# Patient Record
Sex: FEMALE | Race: WHITE | Employment: FULL TIME | URBAN - METROPOLITAN AREA
[De-identification: names, ages, dates, MRNs, and addresses within clinical notes are randomized per-mention and may not be internally consistent; named-entity substitution may affect disease eponyms.]

---

## 2018-08-17 ENCOUNTER — CONVERSION ENCOUNTER (OUTPATIENT)
Dept: PLASTIC SURGERY | Facility: CLINIC | Age: 23
End: 2018-08-17

## 2018-08-17 ENCOUNTER — OFFICE VISIT CONVERTED (OUTPATIENT)
Dept: PLASTIC SURGERY | Facility: CLINIC | Age: 23
End: 2018-08-17
Attending: PLASTIC SURGERY

## 2019-02-05 ENCOUNTER — HOSPITAL ENCOUNTER (OUTPATIENT)
Dept: URGENT CARE | Facility: CLINIC | Age: 24
Discharge: HOME | End: 2019-02-05
Attending: FAMILY MEDICINE

## 2019-02-05 LAB — GLUCOSE BLD-MCNC: 140 MG/DL (ref 65–99)

## 2019-08-14 ENCOUNTER — HOSPITAL ENCOUNTER (OUTPATIENT)
Dept: ULTRASOUND IMAGING | Facility: HOSPITAL | Age: 24
Discharge: HOME | End: 2019-08-14
Attending: SPECIALIST

## 2019-09-26 ENCOUNTER — HOSPITAL ENCOUNTER (OUTPATIENT)
Dept: URGENT CARE | Facility: CLINIC | Age: 24
Discharge: HOME | End: 2019-09-26

## 2019-10-01 ENCOUNTER — HOSPITAL ENCOUNTER (OUTPATIENT)
Dept: URGENT CARE | Facility: CLINIC | Age: 24
Discharge: HOME | End: 2019-10-01
Attending: EMERGENCY MEDICINE

## 2020-02-18 ENCOUNTER — HOSPITAL ENCOUNTER (OUTPATIENT)
Dept: URGENT CARE | Facility: CLINIC | Age: 25
Discharge: HOME | End: 2020-02-18
Attending: PHYSICIAN ASSISTANT

## 2020-08-11 ENCOUNTER — HOSPITAL ENCOUNTER (OUTPATIENT)
Dept: URGENT CARE | Facility: CLINIC | Age: 25
Discharge: HOME | End: 2020-08-11
Attending: PHYSICIAN ASSISTANT

## 2020-09-03 ENCOUNTER — HOSPITAL ENCOUNTER (OUTPATIENT)
Dept: ULTRASOUND IMAGING | Facility: HOSPITAL | Age: 25
Discharge: HOME | End: 2020-09-03
Attending: OBSTETRICS & GYNECOLOGY

## 2020-09-17 ENCOUNTER — HOSPITAL ENCOUNTER (OUTPATIENT)
Dept: ULTRASOUND IMAGING | Facility: HOSPITAL | Age: 25
Discharge: HOME | End: 2020-09-17
Attending: OBSTETRICS & GYNECOLOGY

## 2020-10-02 ENCOUNTER — HOSPITAL ENCOUNTER (OUTPATIENT)
Dept: URGENT CARE | Facility: CLINIC | Age: 25
Discharge: HOME | End: 2020-10-02
Attending: PHYSICIAN ASSISTANT

## 2020-12-23 ENCOUNTER — HOSPITAL ENCOUNTER (OUTPATIENT)
Dept: LABOR AND DELIVERY | Facility: HOSPITAL | Age: 25
Discharge: HOME | End: 2020-12-23
Attending: OBSTETRICS & GYNECOLOGY

## 2021-01-27 ENCOUNTER — HOSPITAL ENCOUNTER (OUTPATIENT)
Dept: LABOR AND DELIVERY | Facility: HOSPITAL | Age: 26
Discharge: HOME | End: 2021-01-27
Attending: OBSTETRICS & GYNECOLOGY

## 2021-01-27 LAB
ALBUMIN SERPL-MCNC: 3.4 G/DL (ref 3.5–5)
ALBUMIN/GLOB SERPL: 1.2 {RATIO} (ref 1.4–2.6)
ALP SERPL-CCNC: 115 U/L (ref 42–98)
ALT SERPL-CCNC: <5 U/L (ref 10–40)
ANION GAP SERPL CALC-SCNC: 14 MMOL/L (ref 8–19)
AST SERPL-CCNC: 9 U/L (ref 15–50)
BASOPHILS # BLD AUTO: 0.06 10*3/UL (ref 0–0.2)
BASOPHILS NFR BLD AUTO: 0.6 % (ref 0–3)
BILIRUB SERPL-MCNC: 0.3 MG/DL (ref 0.2–1.3)
BUN SERPL-MCNC: 4 MG/DL (ref 5–25)
BUN/CREAT SERPL: 7 {RATIO} (ref 6–20)
CALCIUM SERPL-MCNC: 9.3 MG/DL (ref 8.7–10.4)
CHLORIDE SERPL-SCNC: 102 MMOL/L (ref 99–111)
CONV ABS IMM GRAN: 0.03 10*3/UL (ref 0–0.2)
CONV CO2: 22 MMOL/L (ref 22–32)
CONV IMMATURE GRAN: 0.3 % (ref 0–1.8)
CONV TOTAL PROTEIN: 6.3 G/DL (ref 6.3–8.2)
CREAT UR-MCNC: 0.56 MG/DL (ref 0.5–0.9)
DEPRECATED RDW RBC AUTO: 42.7 FL (ref 36.4–46.3)
EOSINOPHIL # BLD AUTO: 0.13 10*3/UL (ref 0–0.7)
EOSINOPHIL # BLD AUTO: 1.3 % (ref 0–7)
ERYTHROCYTE [DISTWIDTH] IN BLOOD BY AUTOMATED COUNT: 13.2 % (ref 11.7–14.4)
GFR SERPLBLD BASED ON 1.73 SQ M-ARVRAT: >60 ML/MIN/{1.73_M2}
GLOBULIN UR ELPH-MCNC: 2.9 G/DL (ref 2–3.5)
GLUCOSE SERPL-MCNC: 103 MG/DL (ref 65–99)
HCT VFR BLD AUTO: 35.4 % (ref 37–47)
HGB BLD-MCNC: 12.5 G/DL (ref 12–16)
LYMPHOCYTES # BLD AUTO: 2.54 10*3/UL (ref 1–5)
LYMPHOCYTES NFR BLD AUTO: 24.6 % (ref 20–45)
MCH RBC QN AUTO: 31.3 PG (ref 27–31)
MCHC RBC AUTO-ENTMCNC: 35.3 G/DL (ref 33–37)
MCV RBC AUTO: 88.5 FL (ref 81–99)
MONOCYTES # BLD AUTO: 0.47 10*3/UL (ref 0.2–1.2)
MONOCYTES NFR BLD AUTO: 4.6 % (ref 3–10)
NEUTROPHILS # BLD AUTO: 7.08 10*3/UL (ref 2–8)
NEUTROPHILS NFR BLD AUTO: 68.6 % (ref 30–85)
NRBC CBCN: 0 % (ref 0–0.7)
OSMOLALITY SERPL CALC.SUM OF ELEC: 275 MOSM/KG (ref 273–304)
PLATELET # BLD AUTO: 174 10*3/UL (ref 130–400)
PMV BLD AUTO: 11.1 FL (ref 9.4–12.3)
POTASSIUM SERPL-SCNC: 3.7 MMOL/L (ref 3.5–5.3)
RBC # BLD AUTO: 4 10*6/UL (ref 4.2–5.4)
SODIUM SERPL-SCNC: 134 MMOL/L (ref 135–147)
WBC # BLD AUTO: 10.31 10*3/UL (ref 4.8–10.8)

## 2021-01-29 ENCOUNTER — TELEPHONE (OUTPATIENT)
Dept: OBSTETRICS AND GYNECOLOGY | Age: 26
End: 2021-01-29

## 2021-02-09 ENCOUNTER — OFFICE VISIT CONVERTED (OUTPATIENT)
Dept: SURGERY | Facility: CLINIC | Age: 26
End: 2021-02-09
Attending: SURGERY

## 2021-05-14 VITALS — WEIGHT: 206.12 LBS | BODY MASS INDEX: 35.19 KG/M2 | RESPIRATION RATE: 14 BRPM | HEIGHT: 64 IN

## 2021-05-14 NOTE — PROGRESS NOTES
"   Progress Note      Patient Name: Beti Joseph   Patient ID: [id number]   Sex: Female   YOB: 1995    Referring Provider: [facility]    Visit Date: 2021    Provider: Jake Weiner MD   [place] [facility]   Location Address: [address] [place] [address]  [id number]   Location Phone: [phone]          Chief Complaint  · Follow Up Surgery      History Of Present Illness     Ms. Joseph is seen in follow-up status post open appendectomy.       Past Medical History  Anxiety; Bipolar 1 disorder; Breast lump; Depression; Diabetes; GERD; Macromastia; Migraine         Past Surgical History  Appendectomy; Breast;  section; Excision of gall bladder; Gallbladder         Allergy List  NO KNOWN DRUG ALLERGIES         Family Medical History  Diabetes, unspecified type; -Father's Family History Unknown; Diabetes         Social History  Tobacco (Current every day)         Review of Systems  · Cardiovascular  o Denies  o : chest pain on exertion, shortness of breath, lower extremity swelling  · Respiratory  o Denies  o : wheezing, chronic cough, coughing up blood  · Gastrointestinal  o Denies  o : diarrhea, chronic abdominal pain, reflux symptoms      Vitals  Date Time BP Position Site L\R Cuff Size HR RR TEMP (F) WT  HT  BMI kg/m2 BSA m2 O2 Sat FR L/min FiO2 HC       2021 09:13 AM       12  206lbs 2oz 5'  4\" 35.38 2.05             Physical Examination     Her incision is healing. Her clips were removed.           Assessment  · Encounter for examination following surgery     V67.00/Z09      Plan  · Medications  o Medications have been Reconciled  o Transition of Care or Provider Policy  · Instructions  o F/U PRN  o Electronically Identified Patient Education Materials Provided Electronically  · Referrals  o ID: [id number] Date: 2021 Type: Inbound  Specialty: General Surgery            Electronically Signed by: Alma Velez-, -Author on February " 16, 2021 01:40:15 PM  Electronically Co-signed by: Jake Weiner MD -Reviewer on February 18, 2021 08:09:05 AM

## 2021-05-16 VITALS
WEIGHT: 212 LBS | OXYGEN SATURATION: 97 % | HEART RATE: 66 BPM | BODY MASS INDEX: 36.19 KG/M2 | HEIGHT: 64 IN | SYSTOLIC BLOOD PRESSURE: 92 MMHG | DIASTOLIC BLOOD PRESSURE: 61 MMHG

## 2021-10-20 ENCOUNTER — OFFICE VISIT (OUTPATIENT)
Dept: INTERNAL MEDICINE | Facility: CLINIC | Age: 26
End: 2021-10-20

## 2021-10-20 VITALS
TEMPERATURE: 97.2 F | WEIGHT: 199.6 LBS | BODY MASS INDEX: 34.08 KG/M2 | OXYGEN SATURATION: 99 % | DIASTOLIC BLOOD PRESSURE: 84 MMHG | HEART RATE: 71 BPM | SYSTOLIC BLOOD PRESSURE: 112 MMHG | HEIGHT: 64 IN

## 2021-10-20 DIAGNOSIS — Z23 NEED FOR INFLUENZA VACCINATION: ICD-10-CM

## 2021-10-20 DIAGNOSIS — K21.9 GASTROESOPHAGEAL REFLUX DISEASE, UNSPECIFIED WHETHER ESOPHAGITIS PRESENT: ICD-10-CM

## 2021-10-20 DIAGNOSIS — M25.562 CHRONIC PAIN OF BOTH KNEES: Primary | ICD-10-CM

## 2021-10-20 DIAGNOSIS — F41.9 ANXIETY: ICD-10-CM

## 2021-10-20 DIAGNOSIS — R41.840 INATTENTION: ICD-10-CM

## 2021-10-20 DIAGNOSIS — Z00.00 ANNUAL PHYSICAL EXAM: ICD-10-CM

## 2021-10-20 DIAGNOSIS — G89.29 CHRONIC PAIN OF BOTH KNEES: Primary | ICD-10-CM

## 2021-10-20 DIAGNOSIS — M25.561 CHRONIC PAIN OF BOTH KNEES: Primary | ICD-10-CM

## 2021-10-20 DIAGNOSIS — F33.1 MAJOR DEPRESSIVE DISORDER, RECURRENT, MODERATE (HCC): ICD-10-CM

## 2021-10-20 LAB
ALBUMIN SERPL-MCNC: 4.4 G/DL (ref 3.5–5.2)
ALBUMIN/GLOB SERPL: 1.6 G/DL
ALP SERPL-CCNC: 88 U/L (ref 39–117)
ALT SERPL W P-5'-P-CCNC: 16 U/L (ref 1–33)
ANION GAP SERPL CALCULATED.3IONS-SCNC: 10.5 MMOL/L (ref 5–15)
AST SERPL-CCNC: 17 U/L (ref 1–32)
BASOPHILS # BLD AUTO: 0.06 10*3/MM3 (ref 0–0.2)
BASOPHILS NFR BLD AUTO: 0.6 % (ref 0–1.5)
BILIRUB SERPL-MCNC: <0.2 MG/DL (ref 0–1.2)
BUN SERPL-MCNC: 17 MG/DL (ref 6–20)
BUN/CREAT SERPL: 26.6 (ref 7–25)
CALCIUM SPEC-SCNC: 9.1 MG/DL (ref 8.6–10.5)
CHLORIDE SERPL-SCNC: 106 MMOL/L (ref 98–107)
CHOLEST SERPL-MCNC: 110 MG/DL (ref 0–200)
CO2 SERPL-SCNC: 22.5 MMOL/L (ref 22–29)
CREAT SERPL-MCNC: 0.64 MG/DL (ref 0.57–1)
DEPRECATED RDW RBC AUTO: 45.4 FL (ref 37–54)
EOSINOPHIL # BLD AUTO: 0.61 10*3/MM3 (ref 0–0.4)
EOSINOPHIL NFR BLD AUTO: 6.4 % (ref 0.3–6.2)
ERYTHROCYTE [DISTWIDTH] IN BLOOD BY AUTOMATED COUNT: 14.3 % (ref 12.3–15.4)
GFR SERPL CREATININE-BSD FRML MDRD: 110 ML/MIN/1.73
GLOBULIN UR ELPH-MCNC: 2.7 GM/DL
GLUCOSE SERPL-MCNC: 112 MG/DL (ref 65–99)
HCT VFR BLD AUTO: 38.9 % (ref 34–46.6)
HDLC SERPL-MCNC: 41 MG/DL (ref 40–60)
HGB BLD-MCNC: 12.6 G/DL (ref 12–15.9)
IMM GRANULOCYTES # BLD AUTO: 0.03 10*3/MM3 (ref 0–0.05)
IMM GRANULOCYTES NFR BLD AUTO: 0.3 % (ref 0–0.5)
LDLC SERPL CALC-MCNC: 55 MG/DL (ref 0–100)
LDLC/HDLC SERPL: 1.36 {RATIO}
LYMPHOCYTES # BLD AUTO: 2.85 10*3/MM3 (ref 0.7–3.1)
LYMPHOCYTES NFR BLD AUTO: 29.9 % (ref 19.6–45.3)
MCH RBC QN AUTO: 28.2 PG (ref 26.6–33)
MCHC RBC AUTO-ENTMCNC: 32.4 G/DL (ref 31.5–35.7)
MCV RBC AUTO: 87 FL (ref 79–97)
MONOCYTES # BLD AUTO: 0.42 10*3/MM3 (ref 0.1–0.9)
MONOCYTES NFR BLD AUTO: 4.4 % (ref 5–12)
NEUTROPHILS NFR BLD AUTO: 5.57 10*3/MM3 (ref 1.7–7)
NEUTROPHILS NFR BLD AUTO: 58.4 % (ref 42.7–76)
NRBC BLD AUTO-RTO: 0 /100 WBC (ref 0–0.2)
PLATELET # BLD AUTO: 254 10*3/MM3 (ref 140–450)
PMV BLD AUTO: 12.1 FL (ref 6–12)
POTASSIUM SERPL-SCNC: 4.2 MMOL/L (ref 3.5–5.2)
PROT SERPL-MCNC: 7.1 G/DL (ref 6–8.5)
RBC # BLD AUTO: 4.47 10*6/MM3 (ref 3.77–5.28)
SODIUM SERPL-SCNC: 139 MMOL/L (ref 136–145)
TRIGL SERPL-MCNC: 66 MG/DL (ref 0–150)
TSH SERPL DL<=0.05 MIU/L-ACNC: 1.07 UIU/ML (ref 0.27–4.2)
VLDLC SERPL-MCNC: 14 MG/DL (ref 5–40)
WBC # BLD AUTO: 9.54 10*3/MM3 (ref 3.4–10.8)

## 2021-10-20 PROCEDURE — 84443 ASSAY THYROID STIM HORMONE: CPT | Performed by: NURSE PRACTITIONER

## 2021-10-20 PROCEDURE — 85025 COMPLETE CBC W/AUTO DIFF WBC: CPT | Performed by: NURSE PRACTITIONER

## 2021-10-20 PROCEDURE — 90471 IMMUNIZATION ADMIN: CPT | Performed by: NURSE PRACTITIONER

## 2021-10-20 PROCEDURE — 99385 PREV VISIT NEW AGE 18-39: CPT | Performed by: NURSE PRACTITIONER

## 2021-10-20 PROCEDURE — 80061 LIPID PANEL: CPT | Performed by: NURSE PRACTITIONER

## 2021-10-20 PROCEDURE — 80053 COMPREHEN METABOLIC PANEL: CPT | Performed by: NURSE PRACTITIONER

## 2021-10-20 PROCEDURE — 3008F BODY MASS INDEX DOCD: CPT | Performed by: NURSE PRACTITIONER

## 2021-10-20 PROCEDURE — 90686 IIV4 VACC NO PRSV 0.5 ML IM: CPT | Performed by: NURSE PRACTITIONER

## 2021-10-20 PROCEDURE — 2014F MENTAL STATUS ASSESS: CPT | Performed by: NURSE PRACTITIONER

## 2021-10-20 RX ORDER — OMEPRAZOLE 20 MG/1
20 CAPSULE, DELAYED RELEASE ORAL DAILY
Qty: 30 CAPSULE | Refills: 1 | Status: SHIPPED | OUTPATIENT
Start: 2021-10-20 | End: 2021-12-07 | Stop reason: SDUPTHER

## 2021-10-20 RX ORDER — BUSPIRONE HYDROCHLORIDE 5 MG/1
5 TABLET ORAL 3 TIMES DAILY
Qty: 90 TABLET | Refills: 1 | Status: SHIPPED | OUTPATIENT
Start: 2021-10-20 | End: 2021-12-07 | Stop reason: SDUPTHER

## 2021-10-20 RX ORDER — SERTRALINE HYDROCHLORIDE 25 MG/1
25 TABLET, FILM COATED ORAL DAILY
Qty: 30 TABLET | Refills: 1 | Status: SHIPPED | OUTPATIENT
Start: 2021-10-20 | End: 2021-12-07

## 2021-10-20 NOTE — PATIENT INSTRUCTIONS
Preventive Care 21-39 Years Old, Female  Preventive care refers to lifestyle choices and visits with your health care provider that can promote health and wellness. This includes:  · A yearly physical exam. This is also called an annual wellness visit.  · Regular dental and eye exams.  · Immunizations.  · Screening for certain conditions.  · Healthy lifestyle choices, such as:  ? Eating a healthy diet.  ? Getting regular exercise.  ? Not using drugs or products that contain nicotine and tobacco.  ? Limiting alcohol use.  What can I expect for my preventive care visit?  Physical exam  Your health care provider may check your:  · Height and weight. These may be used to calculate your BMI (body mass index). BMI is a measurement that tells if you are at a healthy weight.  · Heart rate and blood pressure.  · Body temperature.  · Skin for abnormal spots.  Counseling  Your health care provider may ask you questions about your:  · Past medical problems.  · Family's medical history.  · Alcohol, tobacco, and drug use.  · Emotional well-being.  · Home life and relationship well-being.  · Sexual activity.  · Diet, exercise, and sleep habits.  · Work and work environment.  · Access to firearms.  · Method of birth control.  · Menstrual cycle.  · Pregnancy history.  What immunizations do I need?    Vaccines are usually given at various ages, according to a schedule. Your health care provider will recommend vaccines for you based on your age, medical history, and lifestyle or other factors, such as travel or where you work.  What tests do I need?    Blood tests  · Lipid and cholesterol levels. These may be checked every 5 years starting at age 20.  · Hepatitis C test.  · Hepatitis B test.  Screening  · Diabetes screening. This is done by checking your blood sugar (glucose) after you have not eaten for a while (fasting).  · STD (sexually transmitted disease) testing, if you are at risk.  · BRCA-related cancer screening. This may be  done if you have a family history of breast, ovarian, tubal, or peritoneal cancers.  · Pelvic exam and Pap test. This may be done every 3 years starting at age 21. Starting at age 30, this may be done every 5 years if you have a Pap test in combination with an HPV test.  Talk with your health care provider about your test results, treatment options, and if necessary, the need for more tests.  Follow these instructions at home:  Eating and drinking    · Eat a healthy diet that includes fresh fruits and vegetables, whole grains, lean protein, and low-fat dairy products.  · Take vitamin and mineral supplements as recommended by your health care provider.  · Do not drink alcohol if:  ? Your health care provider tells you not to drink.  ? You are pregnant, may be pregnant, or are planning to become pregnant.  · If you drink alcohol:  ? Limit how much you have to 0-1 drink a day.  ? Be aware of how much alcohol is in your drink. In the [place] one drink equals one 12 oz bottle of beer (355 mL), one 5 oz glass of wine (148 mL), or one 1½ oz glass of hard liquor (44 mL).    Lifestyle  · Take daily care of your teeth and gums. Brush your teeth every morning and night with fluoride toothpaste. Floss one time each day.  · Stay active. Exercise for at least 30 minutes 5 or more days each week.  · Do not use any products that contain nicotine or tobacco, such as cigarettes, e-cigarettes, and chewing tobacco. If you need help quitting, ask your health care provider.  · Do not use drugs.  · If you are sexually active, practice safe sex. Use a condom or other form of birth control (contraception) in order to prevent pregnancy and STIs (sexually transmitted infections). If you plan to become pregnant, see your health care provider for a pre-conception visit.  · Find healthy ways to cope with stress, such as:  ? Meditation, yoga, or listening to music.  ? Journaling.  ? Talking to a trusted person.  ? Spending time with friends and  family.  Safety  · Always wear your seat belt while driving or riding in a vehicle.  · Do not drive:  ? If you have been drinking alcohol. Do not ride with someone who has been drinking.  ? When you are tired or distracted.  ? While texting.  · Wear a helmet and other protective equipment during sports activities.  · If you have firearms in your house, make sure you follow all gun safety procedures.  · Seek help if you have been physically or sexually abused.  What's next?  · Go to your health care provider once a year for an annual wellness visit.  · Ask your health care provider how often you should have your eyes and teeth checked.  · Stay up to date on all vaccines.  This information is not intended to replace advice given to you by your health care provider. Make sure you discuss any questions you have with your health care provider.  Document Revised: 09/02/2020 Document Reviewed: 08/29/2019  [employer] Patient Education © 2021 [employer]

## 2021-10-20 NOTE — ASSESSMENT & PLAN NOTE
Poorly controlled, will start Zoloft and BuSpar at this time. Discussed with patient that it may take 6-8 weeks for an SSRI/SNRI to reach maximal efficacy and that things will potentially get worse before they get better. Patient aware of black box warning of increased risk of suicidal ideations with these medications. Referral to psychiatry per patient request. Will follow up in four weeks to assess medication effectiveness, sooner if concerns arise. Encouraged patient to seek medical attention immediately if mental health is deteriorating. Denies SI/HI.

## 2021-10-20 NOTE — ASSESSMENT & PLAN NOTE
Basic labs in clinic today. Encouraged routine dental and eye exams. Discussed age appropriate immunizations, screenings, nutrition and exercise.

## 2021-10-20 NOTE — PROGRESS NOTES
"Chief Complaint  Establish Care (gi odor) and Back Pain    Subjective          Beti Mcclellan presents to [facility]  Previous PCP: Amanda  Last labs: Years  LMP: 9/2021  PAP: 7/2021, normal  Mammogram: Denies family history of breast cancer  Colonoscopy: Denies family history of colon cancer  Influenza vaccination: Would like  Hepatitis A vaccination: Unsure  COVID19 vaccination: Unsure  Eye exam: 9/2021  Dental exam: 2019  Smoking history: 1/2 PPD x 10 years  Specialists: None    Annual physical exam-  Family history of fatal MI in father. Denies chest pain, blurry vision, headache, leg swelling, shortness of breath, seizure activity.     GERD-  Patient reports history of GERD, has started to experience a \"rotten egg smell\" when she burps. Patient reports mom with gastroparesis, is concerned she may have this.    Knee pain-  Patient reports bilateral knee pain since her childhood. States her previous PCP contributed this to her weight and did not further evaluate. Patient reports knee pain is consistent, daily and varies in severity. Patient reports at times the pain is intense and causes her to cry. Patient is unsure whether or not she has had imaging in the past but would like to pursue this at this time. She is agreeable to physical therapy. Denies injury, erythema, edema, weakness.    Anxiety/depression-  Patient reports history of mental health issues as a child, admits to being inpatient for an extended period of time. Patient states she was also diagnosed with ADHD and took medication into her teenage years. Patient reports she currently treats at home with marijuana, states this improves her mental health significantly. Patient reports intermittent episodes of panic attacks. History of suicidal thoughts, most recent episode in 2019 before she met her . Patient has been on medications in the past but cannot recall the names of these medications. " "Patient reports she has had negative experiences with doctors in the past and is uncomfortable discussing these things in detail, however she is interested in being evaluated by psychiatry to discuss a more detailed evaluation and diagnoses. She would like to discuss trying a medication at this time. Denies current SI/HI.      Objective   Vital Signs:   /84   Pulse 71   Temp 97.2 °F (36.2 °C)   Ht 162.6 cm (64\")   Wt 90.5 kg (199 lb 9.6 oz)   SpO2 99%   BMI 34.26 kg/m²     Physical Exam  Constitutional:       Appearance: Normal appearance. She is normal weight.   HENT:      Head: Normocephalic and atraumatic.      Right Ear: Tympanic membrane normal.      Left Ear: Tympanic membrane normal.      Nose: Nose normal.      Mouth/Throat:      Mouth: Mucous membranes are moist.      Pharynx: Oropharynx is clear.   Eyes:      Extraocular Movements: Extraocular movements intact.      Conjunctiva/sclera: Conjunctivae normal.      Pupils: Pupils are equal, round, and reactive to light.   Neck:      Thyroid: No thyroid mass, thyromegaly or thyroid tenderness.   Cardiovascular:      Rate and Rhythm: Normal rate and regular rhythm.      Heart sounds: Normal heart sounds.   Pulmonary:      Effort: Pulmonary effort is normal.      Breath sounds: Normal breath sounds.   Skin:     General: Skin is warm and dry.   Neurological:      General: No focal deficit present.      Mental Status: She is alert and oriented to person, place, and time.   Psychiatric:         Mood and Affect: Mood normal.         Behavior: Behavior normal.         Thought Content: Thought content normal.        Result Review :                 Assessment and Plan    Diagnoses and all orders for this visit:    1. Chronic pain of both knees (Primary)  Assessment & Plan:  Due to duration and severity of symptoms will order imaging at this time. Referral to physical therapy placed. Will determine if further imaging is warranted based on results of imaging, " could consider referral to orthopedics and/or MRI in the future if warranted.    Orders:  -     XR Knee 1 or 2 View Right; Future  -     XR Knee 1 or 2 View Left; Future  -     Ambulatory Referral to Physical Therapy  -     Ambulatory Referral to Psychiatry    2. Inattention  Assessment & Plan:  Referral to psychiatry per patient request.    Orders:  -     Ambulatory Referral to Psychiatry    3. Anxiety  Assessment & Plan:  Poorly controlled, will start Zoloft and BuSpar at this time. Discussed with patient that it may take 6-8 weeks for an SSRI/SNRI to reach maximal efficacy and that things will potentially get worse before they get better. Patient aware of black box warning of increased risk of suicidal ideations with these medications. Referral to psychiatry per patient request. Will follow up in four weeks to assess medication effectiveness, sooner if concerns arise. Encouraged patient to seek medical attention immediately if mental health is deteriorating. Denies SI/HI.    Orders:  -     TSH  -     Ambulatory Referral to Psychiatry  -     sertraline (Zoloft) 25 MG tablet; Take 1 tablet by mouth Daily.  Dispense: 30 tablet; Refill: 1  -     busPIRone (BUSPAR) 5 MG tablet; Take 1 tablet by mouth 3 (Three) Times a Day.  Dispense: 90 tablet; Refill: 1    4. Major depressive disorder, recurrent, moderate (HCC)  -     Ambulatory Referral to Psychiatry    5. Annual physical exam  Assessment & Plan:  Basic labs in clinic today. Encouraged routine dental and eye exams. Discussed age appropriate immunizations, screenings, nutrition and exercise.      Orders:  -     Comprehensive Metabolic Panel  -     CBC & Differential  -     TSH  -     Lipid Panel    6. Gastroesophageal reflux disease, unspecified whether esophagitis present  Assessment & Plan:  Omeprazole to pharmacy. Patient will call return to clinic if symptoms worsen or persist.      7. Need for influenza vaccination  Comments:  Influenza vaccination in clinic  today.    Other orders  -     omeprazole (PrilOSEC) 20 MG capsule; Take 1 capsule by mouth Daily.  Dispense: 30 capsule; Refill: 1      Follow Up   Return in about 1 month (around 11/20/2021).  Patient was given instructions and counseling regarding her condition or for health maintenance advice. Please see specific information pulled into the AVS if appropriate.

## 2021-10-20 NOTE — ASSESSMENT & PLAN NOTE
Due to duration and severity of symptoms will order imaging at this time. Referral to physical therapy placed. Will determine if further imaging is warranted based on results of imaging, could consider referral to orthopedics and/or MRI in the future if warranted.

## 2021-10-22 ENCOUNTER — CLINICAL SUPPORT (OUTPATIENT)
Dept: INTERNAL MEDICINE | Facility: CLINIC | Age: 26
End: 2021-10-22

## 2021-10-22 DIAGNOSIS — R73.09 ELEVATED GLUCOSE: ICD-10-CM

## 2021-10-22 DIAGNOSIS — R73.09 ELEVATED GLUCOSE: Primary | ICD-10-CM

## 2021-10-22 LAB — HBA1C MFR BLD: 5.94 % (ref 4.8–5.6)

## 2021-10-22 PROCEDURE — 83036 HEMOGLOBIN GLYCOSYLATED A1C: CPT | Performed by: NURSE PRACTITIONER

## 2021-10-22 PROCEDURE — 36415 COLL VENOUS BLD VENIPUNCTURE: CPT | Performed by: INTERNAL MEDICINE

## 2021-10-25 DIAGNOSIS — R73.01 IMPAIRED FASTING GLUCOSE: Primary | ICD-10-CM

## 2021-11-05 ENCOUNTER — TELEPHONE (OUTPATIENT)
Dept: INTERNAL MEDICINE | Facility: CLINIC | Age: 26
End: 2021-11-05

## 2021-12-07 ENCOUNTER — OFFICE VISIT (OUTPATIENT)
Dept: INTERNAL MEDICINE | Facility: CLINIC | Age: 26
End: 2021-12-07

## 2021-12-07 VITALS
SYSTOLIC BLOOD PRESSURE: 100 MMHG | HEART RATE: 70 BPM | BODY MASS INDEX: 33.46 KG/M2 | DIASTOLIC BLOOD PRESSURE: 62 MMHG | HEIGHT: 64 IN | TEMPERATURE: 97.2 F | WEIGHT: 196 LBS | OXYGEN SATURATION: 98 %

## 2021-12-07 DIAGNOSIS — F41.9 ANXIETY: Primary | ICD-10-CM

## 2021-12-07 DIAGNOSIS — K21.9 GASTROESOPHAGEAL REFLUX DISEASE, UNSPECIFIED WHETHER ESOPHAGITIS PRESENT: ICD-10-CM

## 2021-12-07 DIAGNOSIS — F17.210 CIGARETTE NICOTINE DEPENDENCE WITHOUT COMPLICATION: ICD-10-CM

## 2021-12-07 DIAGNOSIS — F33.1 MODERATE EPISODE OF RECURRENT MAJOR DEPRESSIVE DISORDER (HCC): ICD-10-CM

## 2021-12-07 DIAGNOSIS — M25.562 CHRONIC PAIN OF BOTH KNEES: ICD-10-CM

## 2021-12-07 DIAGNOSIS — F33.1 MAJOR DEPRESSIVE DISORDER, RECURRENT, MODERATE (HCC): ICD-10-CM

## 2021-12-07 DIAGNOSIS — M25.561 CHRONIC PAIN OF BOTH KNEES: ICD-10-CM

## 2021-12-07 DIAGNOSIS — G89.29 CHRONIC PAIN OF BOTH KNEES: ICD-10-CM

## 2021-12-07 PROCEDURE — 99214 OFFICE O/P EST MOD 30 MIN: CPT | Performed by: NURSE PRACTITIONER

## 2021-12-07 RX ORDER — BUSPIRONE HYDROCHLORIDE 10 MG/1
10 TABLET ORAL 3 TIMES DAILY PRN
Qty: 90 TABLET | Refills: 1 | Status: SHIPPED | OUTPATIENT
Start: 2021-12-07

## 2021-12-07 RX ORDER — ALBUTEROL SULFATE 90 UG/1
2 AEROSOL, METERED RESPIRATORY (INHALATION) EVERY 4 HOURS PRN
Qty: 18 G | Refills: 0 | Status: SHIPPED | OUTPATIENT
Start: 2021-12-07 | End: 2021-12-21

## 2021-12-07 RX ORDER — OMEPRAZOLE 40 MG/1
40 CAPSULE, DELAYED RELEASE ORAL DAILY
Qty: 30 CAPSULE | Refills: 1 | Status: SHIPPED | OUTPATIENT
Start: 2021-12-07

## 2021-12-07 RX ORDER — DULOXETIN HYDROCHLORIDE 30 MG/1
30 CAPSULE, DELAYED RELEASE ORAL DAILY
Qty: 30 CAPSULE | Refills: 1 | Status: SHIPPED | OUTPATIENT
Start: 2021-12-07 | End: 2022-02-14

## 2021-12-07 NOTE — PROGRESS NOTES
"Chief Complaint  Anxiety    Subjective          Beti Mcclellan presents to [facility]  Anxiety/depression-  Patient in clinic for 1 month follow-up after starting Zoloft and BuSpar.  Reports that \"it is hard for me to cry on it, like it has taken my ability away\".  Patient states that this has been nice in a way but she would like to cry if something is truly bothering her. She cannot recall the names of the medications she was on previously. Patient states she thinks she was diagnosed with ADHD and bipolar with severe depression as a child. She has not seen a therapist or psychiatrist since that time. Patient states overall her symptoms have improved since she moved out of her mothers house. States \"we are doing everything we can so my mother cannot claim my children on her taxes next year\". She and her family are current living with friends. Denies SI/HI.    Nicotine dependence-  Patient smoking a little over 1/2 PPD. Has been more stressed with work and getting her children to and from school since they moved to a new district, she has been transporting her kids to and from school. She is interested in nicotine gum to help discontinue smoking, states \"I have these coughing fits and feel like I need something for them\". She declines nicotine patches, states \"I do not like sticker\".     GERD-  Patient reports symptoms improved with omeprazole but she feels like she continues to have breakthrough sulfur burps.     Chronic knee pain-  Imaging of bilateral knees ordered at previous visit, patient has not had imaging yet. Patient was also referred to PT but no showed her first appointment, continues to have knee pain.           Objective   Vital Signs:   /62   Pulse 70   Temp 97.2 °F (36.2 °C)   Ht 162.6 cm (64\")   Wt 88.9 kg (196 lb)   SpO2 98%   BMI 33.64 kg/m²     Physical Exam  Constitutional:       Appearance: Normal appearance. She is normal " weight.   HENT:      Head: Normocephalic and atraumatic.      Nose: Nose normal.      Mouth/Throat:      Mouth: Mucous membranes are moist.      Pharynx: Oropharynx is clear.   Eyes:      Extraocular Movements: Extraocular movements intact.      Conjunctiva/sclera: Conjunctivae normal.      Pupils: Pupils are equal, round, and reactive to light.   Neck:      Thyroid: No thyroid mass, thyromegaly or thyroid tenderness.   Cardiovascular:      Rate and Rhythm: Normal rate and regular rhythm.      Heart sounds: Normal heart sounds.   Pulmonary:      Effort: Pulmonary effort is normal.      Breath sounds: Normal breath sounds.   Skin:     General: Skin is warm and dry.   Neurological:      General: No focal deficit present.      Mental Status: She is alert and oriented to person, place, and time.   Psychiatric:         Mood and Affect: Mood normal.         Behavior: Behavior normal.         Thought Content: Thought content normal.        Result Review :                 Assessment and Plan    Diagnoses and all orders for this visit:    1. Anxiety (Primary)  -     busPIRone (BUSPAR) 10 MG tablet; Take 1 tablet by mouth 3 (Three) Times a Day As Needed (anxiety).  Dispense: 90 tablet; Refill: 1  -     Ambulatory Referral to Psychiatry    2. Moderate episode of recurrent major depressive disorder (HCC)  Assessment & Plan:  Symptoms have improved, however patient feels overmedicated on Zoloft.  Will increase BuSpar to 10 mg 3 times daily as needed and start Cymbalta due to potential to improve chronic pain.  Due to patient report of previous mental health diagnoses will refer to psychiatry for further evaluation.  She will continue to monitor and will seek medical attention immediately if she feels that her mental health is deteriorating.  Patient aware of black box warning associated with SSRI/SNRI for increased risk of suicidal thoughts.  Will follow up in 6 weeks to assess medication effectiveness, sooner if concerns  arise.    Orders:  -     Ambulatory Referral to Psychiatry    3. Gastroesophageal reflux disease, unspecified whether esophagitis present  Assessment & Plan:  Will increase omeprazole to 40 mg, could consider referral to GI to discuss EGD if symptoms worsen or persist.      4. Major depressive disorder, recurrent, moderate (HCC)  Assessment & Plan:  Symptoms have improved, however patient feels overmedicated on Zoloft.  Will increase BuSpar to 10 mg 3 times daily as needed and start Cymbalta due to potential to improve chronic pain.  Due to patient report of previous mental health diagnoses will refer to psychiatry for further evaluation.  She will continue to monitor and will seek medical attention immediately if she feels that her mental health is deteriorating.  Patient aware of black box warning associated with SSRI/SNRI for increased risk of suicidal thoughts.  Will follow up in 6 weeks to assess medication effectiveness, sooner if concerns arise.      5. Cigarette nicotine dependence without complication  Assessment & Plan:  Discussed with patient risk associated with nicotine dependence and vaping.  Nicotine gum to pharmacy, patient aware that insurance may not cover these.  She is aware of risk associated with continuing to smoke and will continue to work on cessation.  Will follow up in 6 weeks to assess effectiveness, sooner if concerns arise.  Albuterol inhaler to pharmacy to use as needed, discussed with patient that cessation is the most important step in preventing symptoms.      6. Chronic pain of both knees  Assessment & Plan:  Patient to obtain imaging as previously ordered, will reschedule physical therapy visit.  Will prescribe Cymbalta for anxiety/depression due to potential to improve chronic pain.  Will determine if referral to orthopedics is necessary based on results of imaging and response to PT.      Other orders  -     DULoxetine (Cymbalta) 30 MG capsule; Take 1 capsule by mouth Daily.   Dispense: 30 capsule; Refill: 1  -     omeprazole (priLOSEC) 40 MG capsule; Take 1 capsule by mouth Daily.  Dispense: 30 capsule; Refill: 1  -     nicotine polacrilex (NICORETTE) 2 MG gum; Chew 1 each As Needed for Smoking Cessation.  Dispense: 50 each; Refill: 1  -     albuterol sulfate  (90 Base) MCG/ACT inhaler; Inhale 2 puffs Every 4 (Four) Hours As Needed for Wheezing or Shortness of Air.  Dispense: 18 g; Refill: 0      Follow Up   Return in about 6 weeks (around 1/18/2022).  Patient was given instructions and counseling regarding her condition or for health maintenance advice. Please see specific information pulled into the AVS if appropriate.

## 2021-12-07 NOTE — ASSESSMENT & PLAN NOTE
Discussed with patient risk associated with nicotine dependence and vaping.  Nicotine gum to pharmacy, patient aware that insurance may not cover these.  She is aware of risk associated with continuing to smoke and will continue to work on cessation.  Will follow up in 6 weeks to assess effectiveness, sooner if concerns arise.  Albuterol inhaler to pharmacy to use as needed, discussed with patient that cessation is the most important step in preventing symptoms.

## 2021-12-07 NOTE — ASSESSMENT & PLAN NOTE
Symptoms have improved, however patient feels overmedicated on Zoloft.  Will increase BuSpar to 10 mg 3 times daily as needed and start Cymbalta due to potential to improve chronic pain.  Due to patient report of previous mental health diagnoses will refer to psychiatry for further evaluation.  She will continue to monitor and will seek medical attention immediately if she feels that her mental health is deteriorating.  Patient aware of black box warning associated with SSRI/SNRI for increased risk of suicidal thoughts.  Will follow up in 6 weeks to assess medication effectiveness, sooner if concerns arise.

## 2021-12-07 NOTE — ASSESSMENT & PLAN NOTE
Will increase omeprazole to 40 mg, could consider referral to GI to discuss EGD if symptoms worsen or persist.

## 2021-12-07 NOTE — ASSESSMENT & PLAN NOTE
Patient to obtain imaging as previously ordered, will reschedule physical therapy visit.  Will prescribe Cymbalta for anxiety/depression due to potential to improve chronic pain.  Will determine if referral to orthopedics is necessary based on results of imaging and response to PT.

## 2021-12-30 DIAGNOSIS — F41.9 ANXIETY: ICD-10-CM

## 2021-12-30 RX ORDER — SERTRALINE HYDROCHLORIDE 25 MG/1
25 TABLET, FILM COATED ORAL DAILY
Qty: 30 TABLET | Refills: 1 | OUTPATIENT
Start: 2021-12-30

## 2021-12-30 NOTE — TELEPHONE ENCOUNTER
The original prescription was discontinued on 12/7/2021 by Lilliam Andersen APRN. Renewing this prescription may not be appropriate.

## 2022-02-14 RX ORDER — DULOXETIN HYDROCHLORIDE 30 MG/1
30 CAPSULE, DELAYED RELEASE ORAL DAILY
Qty: 30 CAPSULE | Refills: 1 | Status: SHIPPED | OUTPATIENT
Start: 2022-02-14

## 2023-11-29 ENCOUNTER — HOSPITAL ENCOUNTER (EMERGENCY)
Facility: HOSPITAL | Age: 28
Discharge: HOME | End: 2023-11-29
Attending: EMERGENCY MEDICINE
Payer: COMMERCIAL

## 2023-11-29 ENCOUNTER — APPOINTMENT (OUTPATIENT)
Dept: ULTRASOUND IMAGING | Facility: HOSPITAL | Age: 28
End: 2023-11-29
Payer: COMMERCIAL

## 2023-11-29 VITALS
RESPIRATION RATE: 16 BRPM | TEMPERATURE: 98 F | HEART RATE: 93 BPM | HEIGHT: 64 IN | SYSTOLIC BLOOD PRESSURE: 118 MMHG | BODY MASS INDEX: 31.43 KG/M2 | DIASTOLIC BLOOD PRESSURE: 56 MMHG | OXYGEN SATURATION: 100 % | WEIGHT: 184.08 LBS

## 2023-11-29 DIAGNOSIS — Z34.90 EARLY STAGE OF PREGNANCY: Primary | ICD-10-CM

## 2023-11-29 DIAGNOSIS — R11.2 NAUSEA AND VOMITING, UNSPECIFIED VOMITING TYPE: ICD-10-CM

## 2023-11-29 LAB
BASOPHILS # BLD AUTO: 0.02 10*3/MM3 (ref 0–0.2)
BASOPHILS NFR BLD AUTO: 0.2 % (ref 0–1.5)
DEPRECATED RDW RBC AUTO: 41.7 FL (ref 37–54)
EOSINOPHIL # BLD AUTO: 0.26 10*3/MM3 (ref 0–0.4)
EOSINOPHIL NFR BLD AUTO: 3.2 % (ref 0.3–6.2)
ERYTHROCYTE [DISTWIDTH] IN BLOOD BY AUTOMATED COUNT: 12.4 % (ref 12.3–15.4)
HCG INTACT+B SERPL-ACNC: NORMAL MIU/ML
HCT VFR BLD AUTO: 37.9 % (ref 34–46.6)
HGB BLD-MCNC: 12.9 G/DL (ref 12–15.9)
IMM GRANULOCYTES # BLD AUTO: 0.02 10*3/MM3 (ref 0–0.05)
IMM GRANULOCYTES NFR BLD AUTO: 0.2 % (ref 0–0.5)
LYMPHOCYTES # BLD AUTO: 2.27 10*3/MM3 (ref 0.7–3.1)
LYMPHOCYTES NFR BLD AUTO: 27.8 % (ref 19.6–45.3)
MCH RBC QN AUTO: 31.2 PG (ref 26.6–33)
MCHC RBC AUTO-ENTMCNC: 34 G/DL (ref 31.5–35.7)
MCV RBC AUTO: 91.8 FL (ref 79–97)
MONOCYTES # BLD AUTO: 0.36 10*3/MM3 (ref 0.1–0.9)
MONOCYTES NFR BLD AUTO: 4.4 % (ref 5–12)
NEUTROPHILS NFR BLD AUTO: 5.24 10*3/MM3 (ref 1.7–7)
NEUTROPHILS NFR BLD AUTO: 64.2 % (ref 42.7–76)
NRBC BLD AUTO-RTO: 0 /100 WBC (ref 0–0.2)
PLATELET # BLD AUTO: 174 10*3/MM3 (ref 140–450)
PMV BLD AUTO: 11.7 FL (ref 6–12)
RBC # BLD AUTO: 4.13 10*6/MM3 (ref 3.77–5.28)
WBC NRBC COR # BLD AUTO: 8.17 10*3/MM3 (ref 3.4–10.8)

## 2023-11-29 PROCEDURE — 85025 COMPLETE CBC W/AUTO DIFF WBC: CPT | Performed by: EMERGENCY MEDICINE

## 2023-11-29 PROCEDURE — 99284 EMERGENCY DEPT VISIT MOD MDM: CPT

## 2023-11-29 PROCEDURE — 84702 CHORIONIC GONADOTROPIN TEST: CPT | Performed by: EMERGENCY MEDICINE

## 2023-11-29 PROCEDURE — 36415 COLL VENOUS BLD VENIPUNCTURE: CPT

## 2023-11-29 PROCEDURE — 76817 TRANSVAGINAL US OBSTETRIC: CPT

## 2023-11-29 RX ORDER — ONDANSETRON 4 MG/1
4 TABLET, ORALLY DISINTEGRATING ORAL 4 TIMES DAILY PRN
Qty: 30 TABLET | Refills: 0 | Status: SHIPPED | OUTPATIENT
Start: 2023-11-29

## 2023-11-29 NOTE — ED PROVIDER NOTES
Time: 12:19 PM EST  Date of encounter:  2023  Independent Historian/Clinical History and Information was obtained by:   Patient    History is limited by: N/A    Chief Complaint: Pregnancy problem      History of Present Illness:  Patient is a 28 y.o. year old female who presents to the emergency department for evaluation of pregnancy problem.  Patient states that her last menstrual cycle was on .  Patient states she has not had any vaginal bleeding or spotting since that time.  Patient states that she does have irregular periods but states that she took an at-home pregnancy test that resulted positive.  Patient states she is concerned due to previous miscarriage.  Patient is currently .  Patient is having symptoms of pregnancy including nausea, vomiting.  She states that she is having low back pain and right pelvic pain.  Patient states she is unable to get in with OB/GYN until the end of December and is very concerned due to previous miscarriage.    HPI    Patient Care Team  Primary Care Provider: Lilliam Andersen APRN    Past Medical History:     No Known Allergies  Past Medical History:   Diagnosis Date    ADHD (attention deficit hyperactivity disorder)     Depression     GERD (gastroesophageal reflux disease)      Past Surgical History:   Procedure Laterality Date    APPENDECTOMY           SECTION      ,,    CHOLECYSTECTOMY      age 14    DILATATION AND CURETTAGE       History reviewed. No pertinent family history.    Home Medications:  Prior to Admission medications    Medication Sig Start Date End Date Taking? Authorizing Provider   brompheniramine-pseudoephedrine-DM 30-2-10 MG/5ML syrup Take 10 mL by mouth 3 (Three) Times a Day As Needed for Congestion or Cough. 23   Wayne Haley MD   ondansetron ODT (ZOFRAN-ODT) 4 MG disintegrating tablet Place 2 tablets on the tongue Every 8 (Eight) Hours As Needed for Nausea or Vomiting. 23   Wayne Haley MD     "    Social History:   Social History     Tobacco Use    Smoking status: Every Day     Packs/day: 0.50     Years: 10.00     Additional pack years: 0.00     Total pack years: 5.00     Types: Cigarettes    Smokeless tobacco: Never   Vaping Use    Vaping Use: Never used   Substance Use Topics    Alcohol use: Not Currently    Drug use: Never         Review of Systems:  Review of Systems   Constitutional:  Negative for fever.   Respiratory:  Positive for shortness of breath (Chronic from smoking).    Cardiovascular:  Negative for chest pain.   Gastrointestinal:  Positive for nausea and vomiting. Negative for abdominal pain and diarrhea.   Genitourinary:  Positive for pelvic pain. Negative for dysuria and vaginal bleeding.   Musculoskeletal:  Positive for back pain.        Physical Exam:  /56 (Patient Position: Sitting)   Pulse 93   Temp 98 °F (36.7 °C) (Oral)   Resp 16   Ht 162.6 cm (64\")   Wt 83.5 kg (184 lb 1.4 oz)   LMP 10/08/2023   SpO2 100%   BMI 31.60 kg/m²     Physical Exam  Vitals and nursing note reviewed.   Constitutional:       General: She is not in acute distress.     Appearance: Normal appearance. She is normal weight. She is not ill-appearing, toxic-appearing or diaphoretic.   HENT:      Head: Normocephalic and atraumatic.      Nose: Nose normal.   Eyes:      Extraocular Movements: Extraocular movements intact.      Conjunctiva/sclera: Conjunctivae normal.      Pupils: Pupils are equal, round, and reactive to light.   Cardiovascular:      Rate and Rhythm: Normal rate and regular rhythm.      Heart sounds: Normal heart sounds.   Pulmonary:      Effort: Pulmonary effort is normal.      Breath sounds: Normal breath sounds.   Abdominal:      General: Abdomen is flat. Bowel sounds are normal. There is no distension.      Palpations: Abdomen is soft. There is no mass.      Tenderness: There is no abdominal tenderness. There is no guarding or rebound.      Hernia: No hernia is present. "   Musculoskeletal:         General: Normal range of motion.      Cervical back: Normal range of motion and neck supple.   Skin:     General: Skin is warm and dry.   Neurological:      General: No focal deficit present.      Mental Status: She is alert and oriented to person, place, and time.   Psychiatric:         Mood and Affect: Mood is anxious.         Behavior: Behavior normal.         Thought Content: Thought content normal.         Judgment: Judgment normal.                Procedures:  Procedures      Medical Decision Making:    Comorbidities that affect care:    Depression, ADHD, smoking    External Notes reviewed:    Previous Clinic Note: OB visit from 2--10-23 per patient was diagnosed with ectopic pregnancy      The following orders were placed and all results were independently analyzed by me:  Orders Placed This Encounter   Procedures    US Ob Transvaginal    hCG, Quantitative, Pregnancy    CBC Auto Differential    CBC & Differential       Medications Given in the Emergency Department:  Medications - No data to display     ED Course:         Labs:    Lab Results (last 24 hours)       Procedure Component Value Units Date/Time    hCG, Quantitative, Pregnancy [[id number]] Collected: 11/29/23 1224    Specimen: Blood Updated: 11/29/23 1304     HCG Quantitative 96,278.00 mIU/mL     Narrative:      HCG Ranges by Gestational Age    Females - non-pregnant premenopausal   </= 1mIU/mL HCG  Females - postmenopausal               </= 7mIU/mL HCG    3 Weeks       5.4   -      72 mIU/mL  4 Weeks      10.2   -     708 mIU/mL  5 Weeks       217   -   8,245 mIU/mL  6 Weeks       152   -  32,177 mIU/mL  7 Weeks     4,059   - 153,767 mIU/mL  8 Weeks    31,366   - 149,094 mIU/mL  9 Weeks    59,109   - 135,901 mIU/mL  10 Weeks   44,186   - 170,409 mIU/mL  12 Weeks   27,107   - 201,615 mIU/mL  14 Weeks   24,302   -  93,646 mIU/mL  15 Weeks   12,540   -  69,747 mIU/mL  16 Weeks    8,904   -  55,332 mIU/mL  17 Weeks    8,240   -   51,793 mIU/mL  18 Weeks    9,649   -  55,271 mIU/mL      CBC & Differential [[id number]]  (Abnormal) Collected: 11/29/23 1224    Specimen: Blood Updated: 11/29/23 1230    Narrative:      The following orders were created for panel order CBC & Differential.  Procedure                               Abnormality         Status                     ---------                               -----------         ------                     CBC Auto Differential[[id number]]        Abnormal            Final result                 Please view results for these tests on the individual orders.    CBC Auto Differential [[id number]]  (Abnormal) Collected: 11/29/23 1224    Specimen: Blood Updated: 11/29/23 1230     WBC 8.17 10*3/mm3      RBC 4.13 10*6/mm3      Hemoglobin 12.9 g/dL      Hematocrit 37.9 %      MCV 91.8 fL      MCH 31.2 pg      MCHC 34.0 g/dL      RDW 12.4 %      RDW-SD 41.7 fl      MPV 11.7 fL      Platelets 174 10*3/mm3      Neutrophil % 64.2 %      Lymphocyte % 27.8 %      Monocyte % 4.4 %      Eosinophil % 3.2 %      Basophil % 0.2 %      Immature Grans % 0.2 %      Neutrophils, Absolute 5.24 10*3/mm3      Lymphocytes, Absolute 2.27 10*3/mm3      Monocytes, Absolute 0.36 10*3/mm3      Eosinophils, Absolute 0.26 10*3/mm3      Basophils, Absolute 0.02 10*3/mm3      Immature Grans, Absolute 0.02 10*3/mm3      nRBC 0.0 /100 WBC              Imaging:    US Ob Transvaginal    Result Date: 11/29/2023  PROCEDURE: US OB TRANSVAGINAL  COMPARISON: None  INDICATIONS: pelvic pain, back pain  TECHNIQUE: Transvaginal OB pelvic ultrasound.   FINDINGS:   Single living intrauterine gestation.  Estimated gestational age by ultrasound 8 weeks 1 day.  Estimated date of delivery 7/9/2024.  Crown rump length 1.7 cm.  4 mm yolk sac.  Fetal heart rate 134 bpm.  Normal appearing right ovary.  1.9 cm left ovarian cyst.  There are no abnormal pelvic fluid collections.  IMPRESSION: Single living intrauterine gestation.  Estimated gestational age 8  weeks 1 day.  Estimated date of delivery 7/9/2024.   AISHWARYA HOWELL MD       Electronically Signed and Approved By: AISHWARYA HOWELL MD on 11/29/2023 at 14:38                Differential Diagnosis and Discussion:    Pelvic Pain: Differential diagnosis includes but is not limited to ectopic pregnancy, ovarian torsion, tubo-ovarian abscess, ovarian cyst, ovulation, oophoritis, abdominal pregnancy, appendicitis, diverticulitis, cystitis, and renal colic    All labs were reviewed and interpreted by me.  Ultrasound impression was interpreted by me.     MDM  Number of Diagnoses or Management Options  Diagnosis management comments: Patient presented to the emergency department today for pregnancy concern.  CBC notes normal white blood cell count along with normal hemoglobin hematocrit.  hCG is noted to be 96,278 which is appropriate for patient estimated gestation of 7 weeks.  Transvaginal ultrasound was completed and notes single live intrauterine pregnancy estimated at 8 weeks 1 day.  I will have patient continue follow-up with OB/GYN as she already has scheduled.  I will prescribe patient Zofran for her nausea and vomiting.       Amount and/or Complexity of Data Reviewed  Clinical lab tests: reviewed and ordered  Tests in the radiology section of CPT®: reviewed and ordered    Risk of Complications, Morbidity, and/or Mortality  Presenting problems: moderate  Diagnostic procedures: low  Management options: low    Patient Progress  Patient progress: stable       Consultants/Shared Management Plan:    None    Social Determinants of Health:    Patient is independent, reliable, and has access to care.       Disposition and Care Coordination:    Discharged: The patient is suitable and stable for discharge with no need for consideration of observation or admission.    I have explained the patient´s condition, diagnoses and treatment plan based on the information available to me at this time. I have answered questions and  addressed any concerns. The patient has a good  understanding of the patient´s diagnosis, condition, and treatment plan as can be expected at this point. The vital signs have been stable. The patient´s condition is stable and appropriate for discharge from the emergency department.      The patient will pursue further outpatient evaluation with the primary care physician or other designated or consulting physician as outlined in the discharge instructions. They are agreeable to this plan of care and follow-up instructions have been explained in detail. The patient has received these instructions in written format and have expressed an understanding of the discharge instructions. The patient is aware that any significant change in condition or worsening of symptoms should prompt an immediate return to this or the closest emergency department or call to 911.  I have explained discharge medications and the need for follow up with the patient/caretakers. This was also printed in the discharge instructions. Patient was discharged with the following medications and follow up:      Medication List        New Prescriptions      ondansetron ODT 4 MG disintegrating tablet  Commonly known as: ZOFRAN-ODT  Place 1 tablet on the tongue 4 (Four) Times a Day As Needed for Nausea or Vomiting.               Where to Get Your Medications        These medications were sent to [id number] - [place], [place] - [address] AT [facility] OF [address]/[facility] & [place] - [phone] [facility] [phone]   [address] [place] [address]      Phone: [phone]   ondansetron ODT 4 MG disintegrating tablet      Lilliam Andersen, DONITA  [address]  [address]  [facility] [address] [phone]             Final diagnoses:   Early stage of pregnancy   Nausea and vomiting, unspecified vomiting type        ED Disposition       ED Disposition   Discharge    Condition   Stable    Comment   --               This medical record  created using voice recognition software.             Mario Solares PA-C  11/29/23 7830

## 2023-11-29 NOTE — DISCHARGE INSTRUCTIONS
Ensure that you follow-up with your OB/GYN as you already have scheduled in January.  May take Zofran as needed for nausea and vomiting.  Your ultrasound completed in the emergency department today estimates that you are 8 weeks and 1 day along at this pregnancy.  Return to the emergency department if you develop severe pain, vaginal bleeding, or other symptoms concerning to you.

## 2024-06-11 ENCOUNTER — HOSPITAL ENCOUNTER (OUTPATIENT)
Facility: HOSPITAL | Age: 29
Discharge: HOME | End: 2024-06-11
Attending: OBSTETRICS & GYNECOLOGY | Admitting: OBSTETRICS & GYNECOLOGY
Payer: COMMERCIAL

## 2024-06-11 VITALS
SYSTOLIC BLOOD PRESSURE: 114 MMHG | RESPIRATION RATE: 18 BRPM | HEART RATE: 72 BPM | OXYGEN SATURATION: 98 % | DIASTOLIC BLOOD PRESSURE: 62 MMHG

## 2024-06-11 LAB
BILIRUB BLD-MCNC: NEGATIVE MG/DL
CLARITY, POC: CLEAR
COLOR UR: YELLOW
GLUCOSE UR STRIP-MCNC: NEGATIVE MG/DL
KETONES UR QL: NEGATIVE
LEUKOCYTE EST, POC: NEGATIVE
NITRITE UR-MCNC: NEGATIVE MG/ML
PH UR: 6 [PH] (ref 5–8)
PROT UR STRIP-MCNC: NEGATIVE MG/DL
RBC # UR STRIP: NEGATIVE /UL
SP GR UR: 1.01 (ref 1–1.03)
UROBILINOGEN UR QL: ABNORMAL

## 2024-06-11 PROCEDURE — G0463 HOSPITAL OUTPT CLINIC VISIT: HCPCS

## 2024-06-11 PROCEDURE — 81002 URINALYSIS NONAUTO W/O SCOPE: CPT | Performed by: OBSTETRICS & GYNECOLOGY

## 2024-06-11 PROCEDURE — 59025 FETAL NON-STRESS TEST: CPT

## 2024-06-11 NOTE — OBED NOTES
VINCENZO Ramirez  Obstetric History and Physical    Chief Complaint   Patient presents with    Abdominal Cramping       Subjective     Patient is a 29 y.o. female  currently at 35w2d, who presents with complaint of contractions.  She denies any loss of fluid or vaginal bleeding.  Positive fetal movement.    Her prenatal care is complicated by limited prenatal care.  Her previous obstetric/gynecological history is noted for is non-contributory.    The following portions of the patients history were reviewed and updated as appropriate: current medications, allergies, past medical history, past surgical history, past family history, past social history, and problem list .       Prenatal Information:  Prenatal Results       Initial Prenatal Labs       Test Value Reference Range Date Time    Hemoglobin ^ 13.7 Gram/dL 11.5 - 15.5 24 1428       12.9 g/dL 12.0 - 15.9 23 1224    Hematocrit ^ 38.5 % 35.0 - 45.0 24 1428       37.9 % 34.0 - 46.6 23 1224    Platelets ^ 198 x10(3)/uL 140 - 400 24 1428       174 10*3/mm3 140 - 450 23 1224    Rubella IgG        Hepatitis B SAg        Hepatitis C Ab ^ NON-REACTIVE  NON-REACTIVE 24 1428    RPR        T. Pallidum Ab         ABO  ABO GROUP*B                                      *21*17:09*CHP NA  21 1553    Rh  RH TYPE*POS                                    *21*17:10*CHP NA  21 1553    Antibody Screen        HIV        Urine Culture        Gonorrhea ^ NOT DETECTED  NOT DETECTED 24 1104    Chlamydia ^ NOT DETECTED  NOT DETECTED 24 1104    TSH        HgB A1c         Varicella IgG        HgB Electrophoresis         Hemoglobinopathy (genetic testing)        Cystic fibrosis                   Fetal testing        Test Value Reference Range Date Time    NIPT        MSAFP        AFP-4                  2nd and 3rd Trimester       Test Value Reference Range Date Time    Hemoglobin (repeated)        Hematocrit (repeated)         Platelets  ^ 198 x10(3)/uL 140 - 400 01/22/24 1428       174 10*3/mm3 140 - 450 11/29/23 1224    1 hour GTT         Antibody Screen (repeated)        3rd TM syphilis scrn (repeated)  RPR         3rd TM syphilis scrn (repeated) FTA        GTT Fasting        GTT 1 Hr ^ 156 mg/dL <180 03/19/24 1107    GTT 2 Hr        GTT 3 Hr        Group B Strep                  Other testing        Test Value Reference Range Date Time    [place] IgG         CMV IgG                   Drug Screening       Test Value Reference Range Date Time    Amphetamine Screen        Barbiturate Screen        Benzodiazepine Screen        Methadone Screen        Phencyclidine Screen        Opiates Screen        THC Screen        Cocaine Screen        Propoxyphene Screen        Buprenorphine Screen        Methamphetamine Screen        Oxycodone Screen        Tricyclic Antidepressants Screen                  Legend    ^: Historical                          External Prenatal Results       Pregnancy Outside Results - Transcribed From Office Records - See Scanned Records For Details       Test Value Date Time    ABO  ABO GROUP*B                                      *03/24/21*17:09*CHP NA 03/24/21 1553    Rh  RH TYPE*POS                                    *03/24/21*17:10*CHP NA 03/24/21 1553    Antibody Screen       Varicella IgG       Rubella       Hgb ^ 13.7 Gram/dL 01/22/24 1428       12.9 g/dL 11/29/23 1224    Hct ^ 38.5 % 01/22/24 1428       37.9 % 11/29/23 1224    HgB A1c        1h GTT       3h GTT Fasting       3h GTT 1 hour ^ 156 mg/dL 03/19/24 1107    3h GTT 2 hour       3h GTT 3 hour        Gonorrhea (discrete) ^ NOT DETECTED  01/23/24 1104    Chlamydia (discrete) ^ NOT DETECTED  01/23/24 1104    RPR       Syphilis Antibody       HBsAg       Herpes Simplex Virus PCR       Herpes Simplex VIrus Culture       HIV       Hep C RNA Quant PCR       Hep C Antibody ^ NON-REACTIVE  01/22/24 1428    AFP       NIPT       Cystic Fibroisis        Group B  Strep       GBS Susceptibility to Clindamycin       GBS Susceptibility to Erythromycin       Fetal Fibronectin       Genetic Testing, Maternal Blood                 Drug Screening       Test Value Date Time    Urine Drug Screen       Amphetamine Screen       Barbiturate Screen       Benzodiazepine Screen       Methadone Screen       Phencyclidine Screen       Opiates Screen       THC Screen       Cocaine Screen       Propoxyphene Screen       Buprenorphine Screen       Methamphetamine Screen       Oxycodone Screen       Tricyclic Antidepressants Screen                 Legend    ^: Historical                             Past OB History:     OB History    Para Term  AB Living   5 3 0 0 1 3   SAB IAB Ectopic Molar Multiple Live Births   1 0 0 0 0 0      # Outcome Date GA Lbr Kavon/2nd Weight Sex Type Anes PTL Lv   5 Current            4 SAB            3 Para            2 Para            1 Para                Past Medical History: Past Medical History:   Diagnosis Date    ADHD (attention deficit hyperactivity disorder)     Depression     GERD (gastroesophageal reflux disease)       Past Surgical History Past Surgical History:   Procedure Laterality Date    APPENDECTOMY           SECTION      ,,    CHOLECYSTECTOMY      age 14    DILATATION AND CURETTAGE        Family History: No family history on file.   Social History:  reports that she has been smoking cigarettes. She has a 5 pack-year smoking history. She has never used smokeless tobacco.   reports that she does not currently use alcohol.   reports no history of drug use.        General ROS: Pertinent items are noted in HPI    Objective       Vital Signs Range for the last 24 hours  Temperature:     Temp Source:     BP: BP: (114)/(62) 114/62   Pulse: Heart Rate:  [72] 72   Respirations: Resp:  [18] 18   SPO2: SpO2:  [98 %] 98 %   O2 Amount (l/min):     O2 Devices Device (Oxygen Therapy): room air   Weight:       Physical  Examination: General appearance - alert, well appearing, and in no distress  Mental status - alert, oriented to person, place, and time  Chest - clear to auscultation, no wheezes, rales or rhonchi, symmetric air entry  Heart - normal rate, regular rhythm, normal S1, S2, no murmurs, rubs, clicks or gallops  Abdomen - soft, nontender, gravid  Extremities - peripheral pulses normal, no pedal edema, no clubbing or cyanosis  Skin - normal coloration and turgor, no rashes, no suspicious skin lesions noted    Presentation: Unknown   Cervix: Exam by: Method: sterile exam per RN   Dilation: Cervical Dilation (cm): 0   Effacement: Cervical Effacement: 30%   Station:         Fetal Heart Rate Assessment   Method:     Beats/min:     Baseline:     Variability:     Accels:     Decels:           Uterine Assessment   Method:     Frequency (min):     Ctx Count in 10 min:     Duration:     Intensity:         [place] Units:       GBS is unknown      Assessment & Plan     Contractions during pregnancy      Assessment:  1.  Intrauterine pregnancy at 35w2d gestation with reactive fetal status.    2.  IUP at 35 weeks estimate gestational age with false labor  Plan:  Discharge home  Strict fetal kick count   labor precautions  Scheduled OB appointment with her OB provider      Dilip Webber MD  2024  17:33 EDT

## 2024-06-11 NOTE — NON STRESS TEST
Obstetrical Non-stress Test Interpretation     Name:  Beti Mcclellan  MRN: [id number]    29 y.o. female  at 35w2d    Indication: NST/LOWER BACK AND ABDOMINAL CRAMPING/LOST MUCOUS PLUG      Fetal Assessment  Fetal Movement: active  Fetal HR Assessment Method: external  Fetal HR (beats/min): 130  Fetal HR Baseline: normal range  Fetal HR Variability: moderate (amplitude range 6 to 25 bpm)  Fetal HR Accelerations: lasting at least 15 seconds  Fetal HR Decelerations: absent    /62 (BP Location: Right arm, Patient Position: Lying)   Pulse 72   Resp 18   LMP 10/08/2023   SpO2 98%     Reason for test: OB Triage (NST/LOWER BACK AND ABDOMINAL CRAMPING)  Date of Test: 2024  Time frame of test: 9187-1838  RN NST Interpretation: Reactive      Angella Garcia RN  2024  17:37 EDT

## 2024-06-22 ENCOUNTER — HOSPITAL ENCOUNTER (OUTPATIENT)
Facility: HOSPITAL | Age: 29
Discharge: HOME | End: 2024-06-22
Attending: OBSTETRICS & GYNECOLOGY | Admitting: OBSTETRICS & GYNECOLOGY
Payer: COMMERCIAL

## 2024-06-22 VITALS
DIASTOLIC BLOOD PRESSURE: 73 MMHG | SYSTOLIC BLOOD PRESSURE: 131 MMHG | OXYGEN SATURATION: 100 % | BODY MASS INDEX: 30.39 KG/M2 | TEMPERATURE: 98.3 F | HEIGHT: 64 IN | HEART RATE: 51 BPM | RESPIRATION RATE: 18 BRPM | WEIGHT: 178 LBS

## 2024-06-22 PROCEDURE — G0463 HOSPITAL OUTPT CLINIC VISIT: HCPCS

## 2024-06-22 PROCEDURE — 59025 FETAL NON-STRESS TEST: CPT

## 2024-06-22 RX ORDER — OMEPRAZOLE 20 MG/1
20 CAPSULE, DELAYED RELEASE ORAL AS NEEDED
COMMUNITY

## 2024-06-22 RX ORDER — PRENATAL VIT NO.126/IRON/FOLIC 28MG-0.8MG
1 TABLET ORAL DAILY
COMMUNITY

## 2024-06-22 RX ORDER — BUPROPION HYDROCHLORIDE 150 MG/1
150 TABLET, EXTENDED RELEASE ORAL DAILY
COMMUNITY

## 2024-06-23 NOTE — NURSING NOTE
at bedside assessing pt., monitor strip viewed, orders received to D/C home with labor precautions and encourage pt. To establish care.

## 2024-06-23 NOTE — NURSING NOTE
Pt. D/c home in stable conditions,labor precautions give, home and follow up care instructions, numbers also provided for Obstetricians in the area to establish care prior to delivery, pt. Voiced understanding, pt. Amb. To awaiting car without distress noted, s/o at side.

## 2024-06-23 NOTE — NURSING NOTE
" 36.6 weeks presents with c/o ctx: off/on since this morning that became more intense at 1800, states \"I had one ctx: that I had to breathe through\", pt. Denies leakage of fluid, or vaginal bleeding, states +fm noted but decreased since the one painful ctx:, pt. States she has only went to one prenatal visit in [place] because she don't have a car, states she was seen here the  and wasn't dilated but she is suppose to have a RC/S,  states since they performed sve that she has been losing her mucus plug,pt. Denies any medical problems, states that the pregnancy has been progressing without any issues, uterine irrit. Noted on monitor with occasional mild ctx: by palpation, abd soft, non-tender with palpation, sve ext OS FT, Int. OS closed, 60-70/-2 posterior, no blood or fluid noted ob exam glove, Fhr 125  notified  of the above and dip urine results, states he will come and assess pt.  "

## 2024-06-23 NOTE — OBED NOTES
VINCENZO Ramirez  Obstetric History and Physical    Chief Complaint   Patient presents with    Contractions       Subjective     Patient is a 29 y.o. female  currently at 36w6d, who presents with complaint of contractions.  She denies any loss of fluid or vaginal bleeding.  Positive fetal movements    PNC provided by: Lexii patient. Her prenatal care is no prenatal care.  Her previous obstetric/gynecological history is noted for prior  section.    The following portions of the patients history were reviewed and updated as appropriate: current medications, allergies, past medical history, past surgical history, past family history, past social history, and problem list .       Prenatal Information:  Prenatal Results       Initial Prenatal Labs       Test Value Reference Range Date Time    Hemoglobin ^ 13.7 Gram/dL 11.5 - 15.5 24 1428       12.9 g/dL 12.0 - 15.9 23 1224    Hematocrit ^ 38.5 % 35.0 - 45.0 24 1428       37.9 % 34.0 - 46.6 23 1224    Platelets ^ 198 x10(3)/uL 140 - 400 24 1428       174 10*3/mm3 140 - 450 23 1224    Rubella IgG        Hepatitis B SAg        Hepatitis C Ab ^ NON-REACTIVE  NON-REACTIVE 24 1428    RPR        T. Pallidum Ab         ABO  ABO GROUP*B                                      *21*17:09*CHP NA  21 1553    Rh  RH TYPE*POS                                    *21*17:10*CHP NA  21 1553    Antibody Screen        HIV        Urine Culture        Gonorrhea ^ NOT DETECTED  NOT DETECTED 24 1104    Chlamydia ^ NOT DETECTED  NOT DETECTED 24 1104    TSH        HgB A1c         Varicella IgG        Hemoglobinopathy Fractionation        Hemoglobinopathy (genetic testing)        Cystic fibrosis                   Fetal testing        Test Value Reference Range Date Time    NIPT        MSAFP        AFP-4                  2nd and 3rd Trimester       Test Value Reference Range Date Time    Hemoglobin (repeated)         Hematocrit (repeated)        Platelets  ^ 198 x10(3)/uL 140 - 400 01/22/24 1428       174 10*3/mm3 140 - 450 11/29/23 1224    1 hour GTT         Antibody Screen (repeated)        3rd TM syphilis scrn (repeated)  RPR         3rd TM syphilis scrn (repeated) FTA        GTT Fasting        GTT 1 Hr ^ 156 mg/dL <180 03/19/24 1107    GTT 2 Hr        GTT 3 Hr        Group B Strep                  Other testing        Test Value Reference Range Date Time    Parvo IgG         CMV IgG                   Drug Screening       Test Value Reference Range Date Time    Amphetamine Screen        Barbiturate Screen        Benzodiazepine Screen        Methadone Screen        Phencyclidine Screen        Opiates Screen        THC Screen        Cocaine Screen        Propoxyphene Screen        Buprenorphine Screen        Methamphetamine Screen        Oxycodone Screen        Tricyclic Antidepressants Screen                  Legend    ^: Historical                          External Prenatal Results       Pregnancy Outside Results - Transcribed From Office Records - See Scanned Records For Details       Test Value Date Time    ABO  ABO GROUP*B                                      *03/24/21*17:09*CHP NA 03/24/21 1553    Rh  RH TYPE*POS                                    *03/24/21*17:10*CHP NA 03/24/21 1553    Antibody Screen       Varicella IgG       Rubella       Hgb ^ 13.7 Gram/dL 01/22/24 1428       12.9 g/dL 11/29/23 1224    Hct ^ 38.5 % 01/22/24 1428       37.9 % 11/29/23 1224    HgB A1c        1h GTT       3h GTT Fasting       3h GTT 1 hour ^ 156 mg/dL 03/19/24 1107    3h GTT 2 hour       3h GTT 3 hour        Gonorrhea (discrete) ^ NOT DETECTED  01/23/24 1104    Chlamydia (discrete) ^ NOT DETECTED  01/23/24 1104    RPR       Syphilis Antibody       HBsAg       Herpes Simplex Virus PCR       Herpes Simplex VIrus Culture       HIV       Hep C RNA Quant PCR       Hep C Antibody ^ NON-REACTIVE  01/22/24 1428    AFP       NIPT       Cystic  Fibroisis        Group B Strep       GBS Susceptibility to Clindamycin       GBS Susceptibility to Erythromycin       Fetal Fibronectin       Genetic Testing, Maternal Blood                 Drug Screening       Test Value Date Time    Urine Drug Screen       Amphetamine Screen       Barbiturate Screen       Benzodiazepine Screen       Methadone Screen       Phencyclidine Screen       Opiates Screen       THC Screen       Cocaine Screen       Propoxyphene Screen       Buprenorphine Screen       Methamphetamine Screen       Oxycodone Screen       Tricyclic Antidepressants Screen                 Legend    ^: Historical                             Past OB History:     OB History    Para Term  AB Living   5 3 3 0 1 3   SAB IAB Ectopic Molar Multiple Live Births   1 0 0 0 0 3      # Outcome Date GA Lbr Kavon/2nd Weight Sex Type Anes PTL Lv   5 Current            4 SAB            3 Term            2 Term            1 Term                Past Medical History: Past Medical History:   Diagnosis Date    ADHD (attention deficit hyperactivity disorder)     Depression     GERD (gastroesophageal reflux disease)       Past Surgical History Past Surgical History:   Procedure Laterality Date    APPENDECTOMY           SECTION      ,,    CHOLECYSTECTOMY      age 14    DILATATION AND CURETTAGE        Family History: No family history on file.   Social History:  reports that she has been smoking cigarettes. She has a 5 pack-year smoking history. She has never used smokeless tobacco.   reports that she does not currently use alcohol.   reports no history of drug use.        General ROS: Pertinent items are noted in HPI    Objective       Vital Signs Range for the last 24 hours  Temperature:     Temp Source:     BP:     Pulse:     Respirations:     SPO2:     O2 Amount (l/min):     O2 Devices     Weight:       Physical Examination: General appearance - alert, well appearing, and in no distress  Mental  status - alert, oriented to person, place, and time  Chest - clear to auscultation, no wheezes, rales or rhonchi, symmetric air entry  Heart - normal rate, regular rhythm, normal S1, S2, no murmurs, rubs, clicks or gallops  Abdomen - soft, nontender, gravid  Extremities - peripheral pulses normal, no pedal edema, no clubbing or cyanosis  Skin - normal coloration and turgor, no rashes, no suspicious skin lesions noted    Presentation: Unknown   Cervix: Exam by: Method: sterile exam per RN   Dilation: Cervical Dilation (cm): 0-1   Effacement: Cervical Effacement: 60-70%   Station:         Fetal Heart Rate Assessment   Method:     Beats/min:     Baseline:     Variability:     Accels:     Decels:           Uterine Assessment   Method:     Frequency (min):     Ctx Count in 10 min:     Duration:     Intensity:         Thornton Units:       GBS is unknown      Assessment & Plan     Contractions during pregnancy      Assessment:  1.  Intrauterine pregnancy at 36w6d gestation with reactive fetal status.    2.  False labor    Plan:  Discharge home  Strict fetal kick count  Labor precautions  Encouraged to establish care      Dilip Webber MD  6/22/2024  20:40 EDT

## 2024-06-23 NOTE — NON STRESS TEST
"Obstetrical Non-stress Test Interpretation     Name:  Beti Mcclellan  MRN: [id number]    29 y.o. female  at 36w6d    Indication: contractions      Fetal Assessment  Fetal Movement: active  Fetal HR Assessment Method: external  Fetal HR (beats/min): 130  Fetal HR Baseline: normal range  Fetal HR Variability: moderate (amplitude range 6 to 25 bpm)  Fetal HR Accelerations: episodic, greater than/equal to 15 bpm, lasting at least 15 seconds  Fetal HR Decelerations: absent  Sinusoidal Pattern Present: absent  Fetal HR Tracing Category: Category I    /73   Pulse 51   Temp 98.3 °F (36.8 °C) (Oral)   Resp 18   Ht 162.6 cm (64\")   Wt 80.7 kg (178 lb)   LMP 10/08/2023   SpO2 100%   BMI 30.55 kg/m²     Reason for test:  contractions  Date of Test: 2024  Time frame of test: 2695-8149  RN NST Interpretation:  reactive for gestational age.      Rona Rivera RN  2024  22:03 EDT  "